# Patient Record
Sex: FEMALE | Race: OTHER
[De-identification: names, ages, dates, MRNs, and addresses within clinical notes are randomized per-mention and may not be internally consistent; named-entity substitution may affect disease eponyms.]

---

## 2018-06-18 ENCOUNTER — TRANSCRIPTION ENCOUNTER (OUTPATIENT)
Age: 18
End: 2018-06-18

## 2018-11-10 ENCOUNTER — TRANSCRIPTION ENCOUNTER (OUTPATIENT)
Age: 18
End: 2018-11-10

## 2018-12-12 ENCOUNTER — EMERGENCY (EMERGENCY)
Facility: HOSPITAL | Age: 18
LOS: 0 days | Discharge: HOME | End: 2018-12-12
Attending: PEDIATRICS | Admitting: PEDIATRICS

## 2018-12-12 VITALS
HEART RATE: 86 BPM | DIASTOLIC BLOOD PRESSURE: 89 MMHG | OXYGEN SATURATION: 100 % | SYSTOLIC BLOOD PRESSURE: 158 MMHG | TEMPERATURE: 97 F | RESPIRATION RATE: 20 BRPM | WEIGHT: 158.73 LBS

## 2018-12-12 DIAGNOSIS — R51 HEADACHE: ICD-10-CM

## 2018-12-12 RX ORDER — KETOROLAC TROMETHAMINE 30 MG/ML
60 SYRINGE (ML) INJECTION ONCE
Qty: 0 | Refills: 0 | Status: DISCONTINUED | OUTPATIENT
Start: 2018-12-12 | End: 2018-12-12

## 2018-12-12 RX ORDER — METOCLOPRAMIDE HCL 10 MG
10 TABLET ORAL ONCE
Qty: 0 | Refills: 0 | Status: COMPLETED | OUTPATIENT
Start: 2018-12-12 | End: 2018-12-12

## 2018-12-12 RX ADMIN — Medication 60 MILLIGRAM(S): at 22:52

## 2018-12-12 RX ADMIN — Medication 10 MILLIGRAM(S): at 23:41

## 2018-12-12 RX ADMIN — Medication 60 MILLIGRAM(S): at 23:41

## 2018-12-12 NOTE — ED PROVIDER NOTE - PHYSICAL EXAMINATION
Physical Exam: VS reviewed. Pt is well appearing, in no respiratory distress. Sitting up in chair. MMM. Cap refill <2 seconds. No obvious skin rash noted. Chest with no retractions, no distress. Neuro exam grossly intact.  Plan:

## 2018-12-12 NOTE — ED PROVIDER NOTE - OBJECTIVE STATEMENT
18 yr old female presents to the ED for evaluation of chronic migraine.  As per patient, she has had full evaluation for her headaches with neuro/MRI and was symptom free for a while.  She has had a chronic headache for the past 1 1/2 months.  No relief with any OTC meds.  + photo/phonophobia.  No vomiting.  Denies any head injury.

## 2018-12-12 NOTE — ED PEDIATRIC NURSE NOTE - OBJECTIVE STATEMENT
Pt came c/o headache and dizziness x 2 months. C/O nausea on exertion. Denies v/d. Denies fevers/chills.

## 2018-12-12 NOTE — ED PROVIDER NOTE - PROGRESS NOTE DETAILS
Will treat with Toradol IM and reglan PO and reassess. Patient states she feels better, will dc home.

## 2018-12-12 NOTE — ED PROVIDER NOTE - MEDICAL DECISION MAKING DETAILS
18 yr old male presents to the ED with chronic headache.  Treated in ED with Toradol and Reglan and states she is feeling better.

## 2019-04-02 ENCOUNTER — TRANSCRIPTION ENCOUNTER (OUTPATIENT)
Age: 19
End: 2019-04-02

## 2020-03-24 ENCOUNTER — TRANSCRIPTION ENCOUNTER (OUTPATIENT)
Age: 20
End: 2020-03-24

## 2021-01-15 ENCOUNTER — OUTPATIENT (OUTPATIENT)
Dept: OUTPATIENT SERVICES | Facility: HOSPITAL | Age: 21
LOS: 1 days | Discharge: HOME | End: 2021-01-15
Payer: MEDICAID

## 2021-01-15 DIAGNOSIS — M54.9 DORSALGIA, UNSPECIFIED: ICD-10-CM

## 2021-01-15 DIAGNOSIS — R22.1 LOCALIZED SWELLING, MASS AND LUMP, NECK: ICD-10-CM

## 2021-01-15 PROCEDURE — 76536 US EXAM OF HEAD AND NECK: CPT | Mod: 26

## 2021-05-17 ENCOUNTER — APPOINTMENT (OUTPATIENT)
Dept: NEUROLOGY | Facility: CLINIC | Age: 21
End: 2021-05-17

## 2024-01-08 ENCOUNTER — EMERGENCY (EMERGENCY)
Facility: HOSPITAL | Age: 24
LOS: 0 days | Discharge: HOME | End: 2024-01-08
Attending: EMERGENCY MEDICINE
Payer: SELF-PAY

## 2024-01-08 VITALS
TEMPERATURE: 98 F | RESPIRATION RATE: 18 BRPM | HEIGHT: 64 IN | HEART RATE: 131 BPM | DIASTOLIC BLOOD PRESSURE: 92 MMHG | WEIGHT: 182.1 LBS | SYSTOLIC BLOOD PRESSURE: 165 MMHG | OXYGEN SATURATION: 100 %
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
## 2024-01-08 DIAGNOSIS — Z91.040 LATEX ALLERGY STATUS: ICD-10-CM
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
## 2024-01-08 DIAGNOSIS — S93.401A SPRAIN OF UNSPECIFIED LIGAMENT OF RIGHT ANKLE, INITIAL ENCOUNTER: ICD-10-CM

## 2024-01-08 DIAGNOSIS — M25.571 PAIN IN RIGHT ANKLE AND JOINTS OF RIGHT FOOT: ICD-10-CM
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
## 2024-01-08 PROCEDURE — 29515 APPLICATION SHORT LEG SPLINT: CPT | Mod: RT

## 2024-01-08 PROCEDURE — 99283 EMERGENCY DEPT VISIT LOW MDM: CPT | Mod: 25

## 2024-01-08 PROCEDURE — 73610 X-RAY EXAM OF ANKLE: CPT | Mod: RT

## 2024-01-08 PROCEDURE — 99284 EMERGENCY DEPT VISIT MOD MDM: CPT | Mod: 25

## 2024-01-08 PROCEDURE — 73610 X-RAY EXAM OF ANKLE: CPT | Mod: 26,RT

## 2024-01-08 NOTE — ED ADULT NURSE NOTE - PRIMARY CARE PROVIDER
Patient's Son Ayden reports patient did not fall this am she slid out of bed. Son states he is not concerned about that, \"ER was over kill\".     Reason for call today was she has been shuffling feet and the change with being able to walk any distance.  Son wants to know what to do about that.   PMD

## 2024-01-08 NOTE — ED PROVIDER NOTE - ATTENDING APP SHARED VISIT CONTRIBUTION OF CARE
24 y.o. female, no PMH, presents to the ED with right ankle injury. Pt was snow tubing on Saturday and fell out of tube, twisting her ankle. Now with pain to the ankle and difficulty ambulating. No head injury, neck or back pain. No other complains. On exam, pt in NAD, AAOx3, head NC/AT, CN II-XII intact, PEERL, EOMi, neck (-) midline tenderness, lungs CTA B/L, CV S1S2 regular, abdomen soft/NT/ND/(+)BS, ext (+) TTP to right lateral and medial malleolus, (+) swelling, (-) TTP over base of 5th metatarsal, pulses intact, (-) calf tenderness or swelling. XR (-) fx. Ankle splinted. Will d/c with crutches. Advised to follow up with ortho.

## 2024-01-08 NOTE — ED PROVIDER NOTE - CLINICAL SUMMARY MEDICAL DECISION MAKING FREE TEXT BOX
Spoke with patient regarding this.   Informed him that [facility] will call him with appt.     He verbalized understanding.    24 y.o. female, no PMH, presents to the ED with right ankle injury. Pt was snow tubing on Saturday and fell out of tube, twisting her ankle. Now with pain to the ankle and difficulty ambulating. No head injury, neck or back pain. No other complains. On exam, pt in NAD, AAOx3, head NC/AT, CN II-XII intact, PEERL, EOMi, neck (-) midline tenderness, lungs CTA B/L, CV S1S2 regular, abdomen soft/NT/ND/(+)BS, ext (+) TTP to right lateral and medial malleolus, (+) swelling, (-) TTP over base of 5th metatarsal, pulses intact, (-) calf tenderness or swelling. XR (-) fx. Ankle splinted. Will d/c with crutches. Advised to follow up with ortho.

## 2024-01-08 NOTE — ED ADULT NURSE NOTE - NSFALLUNIVINTERV_ED_ALL_ED
Bed/Stretcher in lowest position, wheels locked, appropriate side rails in place/Call bell, personal items and telephone in reach/Instruct patient to call for assistance before getting out of bed/chair/stretcher/Non-slip footwear applied when patient is off stretcher/[place] to call system/Physically safe environment - no spills, clutter or unnecessary equipment/Purposeful proactive rounding/Room/bathroom lighting operational, light cord in reach Bed/Stretcher in lowest position, wheels locked, appropriate side rails in place/Call bell, personal items and telephone in reach/Instruct patient to call for assistance before getting out of bed/chair/stretcher/Non-slip footwear applied when patient is off stretcher/[place] to call system/Physically safe environment - no spills, clutter or unnecessary equipment/Purposeful proactive rounding/Room/bathroom lighting operational, light cord in reach

## 2024-01-08 NOTE — ED PROVIDER NOTE - PHYSICAL EXAMINATION
GEN: Patient in no acute distress  MS: Tenderness to right lateral and medial malleolus, Normal ROM in all extremities. No midline spinal tenderness. pulses 2 +. no calf tenderness or swelling.  SKIN: Warm, dry, no acute rashes. Good turgor  NEURO: Strength 5/5 with no sensory deficits. Steady gait.

## 2024-01-08 NOTE — ED PROVIDER NOTE - NSFOLLOWUPINSTRUCTIONS_ED_ALL_ED_FT
Ankle Sprain    An ankle sprain is a stretch or tear in one of the tough, fiber-like tissues (ligaments) in the ankle. The ligaments in your ankle help to hold the bones of the ankle together.     CAUSES  This condition is often caused by stepping on or falling on the outer edge of the foot.    RISK FACTORS  This condition is more likely to develop in people who play sports.    SYMPTOMS  Symptoms of this condition include:    Pain in your ankle.  Swelling.  Bruising. Bruising may develop right after you sprain your ankle or 1–2 days later.  Trouble standing or walking, especially when you turn or change directions.    DIAGNOSIS  This condition is diagnosed with a physical exam. During the exam, your health care provider will press on certain parts of your foot and ankle and try to move them in certain ways. X-rays may be taken to see how severe the sprain is and to check for broken bones.    TREATMENT  This condition may be treated with:    A brace. This is used to keep the ankle from moving until it heals.  An elastic bandage. This is used to support the ankle.  Crutches.  Pain medicine.  Surgery. This may be needed if the sprain is severe.  Physical therapy. This may help to improve the range of motion in the ankle.    HOME CARE INSTRUCTIONS  Rest your ankle.  Take over-the-counter and prescription medicines only as told by your health care provider.  For 2–3 days, keep your ankle raised (elevated) above the level of your heart as much as possible.  If directed, apply ice to the area:  Put ice in a plastic bag.  Place a towel between your skin and the bag.  Leave the ice on for 20 minutes, 2–3 times a day.  If you were given a brace:  Wear it as directed.  Remove it to shower or bathe.  Try not to move your ankle much, but wiggle your toes from time to time. This helps to prevent swelling.  If you were given an elastic bandage (dressing):  Remove it to shower or bathe.  Try not to move your ankle much, but wiggle your toes from time to time. This helps to prevent swelling.  Adjust the dressing to make it more comfortable if it feels too tight.  Loosen the dressing if you have numbness or tingling in your foot, or if your foot becomes cold and blue.  If you have crutches, use them as told by your health care provider. Continue to use them until you can walk without feeling pain in your ankle.    SEEK MEDICAL CARE IF:  You have rapidly increasing bruising or swelling.  Your pain is not relieved with medicine.    SEEK IMMEDIATE MEDICAL CARE IF:  Your toes or foot becomes numb or blue.  You have severe pain that gets worse.    ADDITIONAL NOTES AND INSTRUCTIONS    Please follow up with your Primary MD in 24-48 hr.  Seek immediate medical care for any new/worsening signs or symptoms.    Our Emergency Department Referral Coordinators will be reaching out to you in the next 24-48 hours from 9:00am to 5:00pm with a follow up appointment. Please expect a phone call from the hospital in that time frame. If you do not receive a call or if you have any questions or concerns, you can reach them at   [phone] Ankle Sprain    An ankle sprain is a stretch or tear in one of the tough, fiber-like tissues (ligaments) in the ankle. The ligaments in your ankle help to hold the bones of the ankle together.     CAUSES  This condition is often caused by stepping on or falling on the outer edge of the foot.    RISK FACTORS  This condition is more likely to develop in people who play sports.    SYMPTOMS  Symptoms of this condition include:    Pain in your ankle.  Swelling.  Bruising. Bruising may develop right after you sprain your ankle or 1–2 days later.  Trouble standing or walking, especially when you turn or change directions.    DIAGNOSIS  This condition is diagnosed with a physical exam. During the exam, your health care provider will press on certain parts of your foot and ankle and try to move them in certain ways. X-rays may be taken to see how severe the sprain is and to check for broken bones.    TREATMENT  This condition may be treated with:    A brace. This is used to keep the ankle from moving until it heals.  An elastic bandage. This is used to support the ankle.  Crutches.  Pain medicine.  Surgery. This may be needed if the sprain is severe.  Physical therapy. This may help to improve the range of motion in the ankle.    HOME CARE INSTRUCTIONS  Rest your ankle.  Take over-the-counter and prescription medicines only as told by your health care provider.  For 2–3 days, keep your ankle raised (elevated) above the level of your heart as much as possible.  If directed, apply ice to the area:  Put ice in a plastic bag.  Place a towel between your skin and the bag.  Leave the ice on for 20 minutes, 2–3 times a day.  If you were given a brace:  Wear it as directed.  Remove it to shower or bathe.  Try not to move your ankle much, but wiggle your toes from time to time. This helps to prevent swelling.  If you were given an elastic bandage (dressing):  Remove it to shower or bathe.  Try not to move your ankle much, but wiggle your toes from time to time. This helps to prevent swelling.  Adjust the dressing to make it more comfortable if it feels too tight.  Loosen the dressing if you have numbness or tingling in your foot, or if your foot becomes cold and blue.  If you have crutches, use them as told by your health care provider. Continue to use them until you can walk without feeling pain in your ankle.    SEEK MEDICAL CARE IF:  You have rapidly increasing bruising or swelling.  Your pain is not relieved with medicine.    SEEK IMMEDIATE MEDICAL CARE IF:  Your toes or foot becomes numb or blue.  You have severe pain that gets worse.    ADDITIONAL NOTES AND INSTRUCTIONS    Please follow up with your Primary MD in 24-48 hr.  Seek immediate medical care for any new/worsening signs or symptoms.    Our Emergency Department Referral Coordinators will be reaching out to you in the next 24-48 hours from 9:00am to 5:00pm with a follow up appointment. Please expect a phone call from the hospital in that time frame. If you do not receive a call or if you have any questions or concerns, you can reach them at   [phone]

## 2024-01-08 NOTE — ED PROVIDER NOTE - PATIENT PORTAL LINK FT
You can access the FollowMyHealth Patient Portal offered by [facility] by registering at the following website: [email or web address]. By joining [employer]’s FollowMyHealth portal, you will also be able to view your health information using other applications (apps) compatible with our system. You can access the FollowMyHealth Patient Portal offered by [facility] by registering at the following website: [email or web address]. By joining [employer]’s FollowMyHealth portal, you will also be able to view your health information using other applications (apps) compatible with our system.

## 2024-01-08 NOTE — ED ADULT NURSE NOTE - IS THE PATIENT ABLE TO BE SCREENED?
DOS 10/18/2022  Fany Aguirre is seen today for chief complaint   Chief Complaint   Patient presents with   • Office Visit     Refill meds     History of Present Illness:   She has seen a FP in July to establish care but says did not go since she noted that there is no permanent person so decided to come back here for follo wup s   Is referred to neurology in [facility] and has appointment on November 10 2022. She says neuropathy is worse specially on right side and has had operation on that foot .  Says the neuropathy is all over not just in feet or legs and says not sure if the neurologist could do treatment to make progression slower.   She wants to die in peace.   Says her daughter and son in law are living with her and that is good since she did not want to move to a facility          Saw Dr Bernal in July 2022 and as follow ;  . ChangeTrelegy inhaler to [place] inhaler twice daily with mouth washing after each use.  2. Albuterol 2 puffs every 4-6 hours as needed for shortness of breath or wheezing.  3. Duop nebs to be used 4 times daily and on as needed bases.  4. On the previous visit we discussed code status and she does not want CPR or intubation.    5. On as needed flutter valve 4 times daily for excessive secretions.  6. Mucinex syrup on as needed basis every 4 to 6 hours for coughing.  7. Benzonatate 100 mg 3 times daily on as-needed basis for cough control.    8. Completed taking both pneumonia vaccines.  9. Follow-up appointment after 1 year.      Patient Care Team:  Franco Gtz MD as PCP - General (Internal Medicine)  Nany Rand MD as Dermatology (Dermatology)  Angelito Anne MD (Hematology & Oncology)  Conrad Horton MD (Obstetrics & Gynecology)  Melchor Rodriguez MD (Orthopedic Surgery)  Zohreh Linder RN as Ambulatory Care Manager  (Registered Nurse)     Past Medical History:   Diagnosis Date   • Basal cell carcinoma (BCC) of right cheek    • Cervicalgia    • Chronic pain  Yes syndrome    • COPD (chronic obstructive pulmonary disease) (CMS/HCC)    • DDD (degenerative disc disease), lumbosacral    • GBS (Guillain Drury syndrome) (CMS/HCC)    • HLD (hyperlipidemia)    • HTN (hypertension)    • Hyperthyroidism     H/o Grave's Disease   • SEB (iron deficiency anemia)    • Lumbosacral spondylosis without myelopathy    • Lung cancer (CMS/HCC)     9/8/16: Left lung biopsy: Well-differentiated adenocarcinoma, TTF-1 positive. EGFR,ALK, ROS1 neg, PDL-1 0% 9/30/16: Bronch with navigation: R LN neg 10/10/16: RUL Bx: AdenoCA, well diff with lepidic/acinar features, TTF-1 +. 11/16/2016 - 12/8/2016: Total dose--4000cGy/16fraction   • MDD (major depressive disorder)    • MRSA (methicillin resistant Staphylococcus aureus)    • PAD (peripheral artery disease) (CMS/HCC)     Arterial insufficiency with ischemic ulcer   • Postprocedural pneumothorax    • Pulmonary hypertension (CMS/HCC)    • Scoliosis (and kyphoscoliosis), idiopathic    • Vitamin D deficiency      Past Surgical History:   Procedure Laterality Date   • Anterior cervical corpectomy w/ fusion  02/22/2013    C4 corpectomy C3-4 fusion   • Destruction by neurolytic agt lumbar or sacral facet jt  11/06/2012    1st Right CMBB 1 week   • Dexa bone density axial skeleton  09/10/1997    Osteoporosis   • Dexa bone density axial skeleton  01/2011    Osteoporosis   • Elbow fracture surgery Left 12/09/2016    Dr Padgett. [facility]   • Elbow surgery Left 12/09/2016    ORIF LEFT ELBOW   • Epidural block injection  10/22/2014    Right L4L5 transformainal epidural injection   • Flexible sigmoidoscopy diagnostic include specimens  11/19/1997    Flex Sig w/o Bx/Dr. Jones/persistent diarrhea   • Foot/toes surgery proc unlisted      Unspecified R.foot/ procedure x3   • Fracture surgery  2012    Right femur fz   • Hernia repair      Inguinal,right   • Hip surgery Left 12/09/2016    ORIF LEFT HIP   • Hip surgery Left 09/12/2018    Left hip hardware removal followed by  hip replaccement/Dr Rodriguez   • Inj epid/subchd anest,lumb/sac  05/21/2002    Dr.Romana/R.lumbar radiculopathy   • Inj epid/subchd anest,lumb/sac  07/15/2013    LMBB   • Inj epidural cervical thoracic  09/25/2012    RIGHT LRFA 1 WEEK   • Open access colonoscopy  11/2010    Colonoscopy, Screen   • Orif hip fracture Left 12/09/2016    Dr Padgett. [facility]   • Pain clinic  2014    Injection   • Pap smear,routine  06/15/2009   • Paravertebral facet inj jnt lumbar sacral 1  07/31/2012    2ND RIGHT LMBB 1 WEEK   • Paravertebral facet inj jnt lumbar sacral 1  09/11/2012    Right Lumbar RF w/ MAC 1 WEEK   • Paravertebral facet inj jnt lumbar sacral 1  08/27/2013    2nd right LMBB   • Pelvis/hip joint surgery unlisted  08/03/2016    Right femoral neck fx: right hip niranjan-arthroplasty/Dr Rodriguez   • Repair rotator cuff,acute  12/11/1995    R.shoulder/  x2--still having ROM issues and pain   • Skin biopsy      Cyst off right arm, cryosurgery in office   • Total knee replacement  2003    Right. [facility]. Dr Daley   • Total shoulder arthroplasty  05/07/2013    Right reverse total shoulder arthroplasty with Dr. Teran    • Upper gastrointestinal endoscopy  11/2010    EGD     Current Outpatient Medications   Medication Sig Dispense Refill   • Ascorbic Acid (vitamin C) 1000 MG tablet Take 1,000 mg by mouth daily.     • metoPROLOL succinate (TOPROL-XL) 25 MG 24 hr tablet Take 1 tablet by mouth once daily 90 tablet 1   • HYDROcodone-acetaminophen (NORCO) 5-325 MG per tablet Take 1 tablet by mouth 2 times daily as needed for Pain. 60 tablet 0   • pantoprazole (PROTONIX) 40 MG tablet Take 1 tablet by mouth once daily 90 tablet 1   • polyethylene glycol (MIRALAX) 17 g packet Take 17 g by mouth as needed. Indications: Constipation Stir and dissolve powder in any 4 to 8 ounces of beverage, then drink.     • fluticasone-salmeterol 100-50 MCG/ACT inhaler Inhale 1 puff into the lungs in the morning and 1 puff before bedtime.  1 each 11   • DISPENSE Take 1 tablet by mouth nightly as needed. Medication: Hylands Leg cramp pills     • buPROPion (WELLBUTRIN SR) 100 MG 12 hr tablet Take 1/2 (one-half) tablet by mouth once daily 45 tablet 1   • ipratropium-albuterol (DUONEB) 0.5-2.5 (3) MG/3ML nebulizer solution Take 3 mLs by nebulization 3 times daily as needed for Wheezing or Shortness of Breath. 100 mL 3   • CALCIUM PO Take 600 mg by mouth daily.     • POTASSIUM PO Take 99 mg by mouth daily.     • DOCUSATE SODIUM PO Take 1 capsule by mouth daily.     • VITAMIN B COMPLEX-C PO Take 1 capsule by mouth daily.      • pravastatin (PRAVACHOL) 20 MG tablet Take 1 tablet by mouth nightly. 90 tablet 3   • Ginseng 100 MG Cap Take 300 mg by mouth daily.     • vitamin D, Cholecalciferol, 25 mcg (1,000 units) capsule Take 25 mcg by mouth daily.      • rivaroxaban (XARELTO) 20 MG Tab Take 1 tablet by mouth daily. Further refills to be done by Dr. Flaquito Pierre - P:[phone] F: [phone] 7 tablet 0   • aspirin 81 MG chewable tablet Chew 81 mg by mouth daily. Pt reports taking     • ferrous sulfate 325 (65 FE) MG tablet Take 1 tablet by mouth 3 times daily (with meals). Indications: Anemia From Inadequate Iron in the Body (Patient taking differently: Take 325 mg by mouth daily. Indications: Anemia From Inadequate Iron in the Body) 90 tablet 0   • acetaminophen (TYLENOL) 500 MG tablet Take 500 mg by mouth 2 times daily as needed for Pain.      • diclofenac (VOLTAREN) 1 % gel Apply 4 g topically 4 times daily as needed for Pain. Apply to the bilateral knees and other painful areas. 300 g 2   • azelaic acid (FINACEA) 15 % gel Apply 1 application topically nightly as needed.      • albuterol (PROAIR HFA) 108 (90 Base) MCG/ACT inhaler Inhale 2 puffs into the lungs 4 times daily as needed for Shortness of Breath or Wheezing. 1 Inhaler 3     No current facility-administered medications for this visit.     ALLERGIES:   Allergen Reactions   • Diphth-Acell  Pertussis-Tetanus Other (See Comments)     Cannot get this due to Guillain-[place] Syndrome   • Influenza Vaccines Other (See Comments)     Cannot get this due to Guillain-[place] Syndrome   • Tdap [Adacel] Other (See Comments)     Cannot get this due to Guillain-[place] Syndrome   • Zoster Vaccine Live Other (See Comments)     Cannot get this due to Guillain-[place] Syndrome   • Ace Inhibitors Other (See Comments)     Hyperkalemia     • Amlodipine DIARRHEA   • Mirtazapine Other (See Comments)     Stopped 12/5/2012 due to rage feeling and leaden legs    • Tizanidine Other (See Comments)     Drowsiness       Review of Systems:    Constitutional:  No weight change or fever.  Eyes:  No vision loss, itching or pain in either eye.  Ears, Nose, Mouth, Throat:  Ears:  No hearing loss or ringing.  Cardiovascular:  No chest pain or palpitations.  Reports foot swelling   Respiratory:  No increasing shortness of breath or cough, no chest pain with breathing.   Gastrointestinal:  No abdominal pain, no change in bowel habits, no nausea or vomiting, no blood in stool.    Genitourinary:    Urinary:  No flank pain or urinary difficulty, no blood in urine, no urinary frequency.  Musculoskeletal:  severe chronic pain multifactorial not new   Integumentary (skin and/or breasts):   Skin:  No rash or itching.   Neurologic:  No focal weakness or headache or dizziness, neuropathy pain   Psychiatric:  No anxiety and no feeling of sadness, no insomnia.     Physical Examination:  Visit Vitals  /70   Pulse 72   Wt 37.6 kg (83 lb)   SpO2 98%   BMI 16.76 kg/m²       Wt Readings from Last 1 Encounters:   10/18/22 37.6 kg (83 lb)      Fany is sharp mentally and is pleasant and cooperative     Eyes are not congested and extraocular movements are intact. Neck is without any lymphadenopathy, thyromegaly, or JVD (jugular venous distention).  There is no carotid bruit.  Lungs are diminished but clear to auscultation bilaterally with as good of  respiratory effort on examination as possible with the severe kyphoscoliosis she has   Heart exam revealed normal S1 and S2 with regular rate and rhythm.  Abdomen is soft and nontender.  Bowel sounds are present.    Extremities- no significant swelling in either side, has severe deformity in mid feet bilaterally with a prominence in mid feet also bilaterally that is red and rubbing on her shoes on medial part but no open ulcer noted . Has cold toes bilaterally and redness in left toes is more, her right foot has redness not pain pn lateral part and soft tissue is looking red. Non tender on her feet.  have no edema and no tenderness and patient is moving all 4 extremities independently and without any difficulty.  Skin is intact without rash or ulceration.    Assessment and Plan:  Fany was seen today for office visit.    Diagnoses and all orders for this visit:    Chronic bilateral low back pain without sciatica  -     HYDROcodone-acetaminophen (NORCO) 5-325 MG per tablet; Take 1 tablet by mouth 2 times daily as needed for Pain.  Taking mostly one a day but if has severe pain take an extra tablet     PVD (peripheral vascular disease) (CMS/[facility])  Follows with Gloria   Neuropathy involving both lower extremities  Has appointment with a neurologist in [facility]. Says wish to know if the neuropathy is related to Joelle bare or something else. Reports understanding that diagnosis really at this point not making any change in her management of pain and progression of that but just likes to know  Generalized osteoarthritis  On pain killers   Severe deformity that also cause more pain including her fete   Patsys a podiatrist in [place] and not sure on name but  I have asked herto discuss with him on a protector for the prominent bony parts that is going to get ulcerated otherwise on both feet   Chronic obstructive pulmonary disease, unspecified COPD type (CMS/[facility])  Sees pulmonologist yearly   Arterial  insufficiency with ischemic ulcer (CMS/[facility])  DR Kelly follows   Chronic anticoagulation  Managed by her vascular specialist   Inhalers will be prescribed by the   Arthritis of foot  Comments:  bilateral with severe defromities     Other orders  -     buPROPion (WELLBUTRIN SR) 100 MG 12 hr tablet; Take 1/2 (one-half) tablet by mouth once daily    She had established with one visit  In July 2022 with a FP in [facility] , I have told her that if she would like to switch is fine but cannot have two PCP both following same problems and she reports that is not switching. So pain contract remains intact and she has not received any pain killer from the other provider     40 minutes time spent on patient care today . That include review of records from out of network before and during the visit  , discussing above medical problems and making plan of care .     Franco Gtz MD  10/18/2022  3:02 PM

## 2024-01-08 NOTE — ED PROVIDER NOTE - OBJECTIVE STATEMENT
24 year old female with no pmhx presents to ed with right ankle injury. pt was snow tubing on Saturday and fell out of tube. complaining of right ankle pain. no head injury, neck or back pain.

## 2024-01-08 NOTE — ED PROVIDER NOTE - CARE PROVIDER_API CALL
Geo Kelly  Orthopaedic Surgery  [address] [place] [address]  Phone: [phone]  Fax: [phone]  Follow Up Time:    Geo Kelly  Orthopaedic Surgery  [address] [place] [address]  Phone: [phone]  Fax: [phone]  Follow Up Time:

## 2024-08-21 NOTE — ED PEDIATRIC NURSE NOTE - NS ED PATIENT SAFETY CONCERN
What Type Of Note Output Would You Prefer (Optional)?: Standard Output Hpi Title: Evaluation of a Skin Lesion How Severe Are Your Spot(S)?: mild Have Your Spot(S) Been Treated In The Past?: has not been treated No

## 2025-07-11 NOTE — ED ADULT TRIAGE NOTE - HEART RATE (BEATS/MIN)
Called patient to let her know that her labs need to be done before she can get any more refills.  Was given a courtesy refill.  Patient understood.  
Pharmacy change due to [facility] closing  
Reason for call:   [x] Refill   [] Prior Auth  [x] Other: only 2 tablets left  - pharmacy change [facility] closed     Office:   [x] PCP/Provider - Rey Angulo, / tip johnston  [] Specialty/Provider -     Medication: levothyroxine 125 mcg tablet     Dose/Frequency:  Take 1 tablet (125 mcg total) by mouth daily,     Quantity: 90    Pharmacy: [id number] - [address] [facility]   Does the patient have enough for 3 days?   [] Yes   [x] No - Send as HP to POD      
131